# Patient Record
Sex: FEMALE | Race: OTHER | NOT HISPANIC OR LATINO | Employment: OTHER | URBAN - METROPOLITAN AREA
[De-identification: names, ages, dates, MRNs, and addresses within clinical notes are randomized per-mention and may not be internally consistent; named-entity substitution may affect disease eponyms.]

---

## 2021-01-02 ENCOUNTER — HOSPITAL ENCOUNTER (OUTPATIENT)
Facility: MEDICAL CENTER | Age: 81
End: 2021-01-02
Payer: COMMERCIAL

## 2021-01-02 LAB
COVID ORDER STATUS COVID19: NORMAL
SARS-COV-2 RNA RESP QL NAA+PROBE: NOTDETECTED
SPECIMEN SOURCE: NORMAL

## 2021-01-14 DIAGNOSIS — Z23 NEED FOR VACCINATION: ICD-10-CM

## 2021-03-12 ENCOUNTER — IMMUNIZATION (OUTPATIENT)
Dept: FAMILY PLANNING/WOMEN'S HEALTH CLINIC | Facility: IMMUNIZATION CENTER | Age: 81
End: 2021-03-12
Attending: INTERNAL MEDICINE
Payer: MEDICARE

## 2021-03-12 DIAGNOSIS — Z23 ENCOUNTER FOR VACCINATION: Primary | ICD-10-CM

## 2021-03-12 DIAGNOSIS — Z23 NEED FOR VACCINATION: ICD-10-CM

## 2021-03-12 PROCEDURE — 91301 MODERNA SARS-COV-2 VACCINE: CPT

## 2021-03-12 PROCEDURE — 0011A MODERNA SARS-COV-2 VACCINE: CPT

## 2021-04-10 ENCOUNTER — IMMUNIZATION (OUTPATIENT)
Dept: FAMILY PLANNING/WOMEN'S HEALTH CLINIC | Facility: IMMUNIZATION CENTER | Age: 81
End: 2021-04-10
Attending: INTERNAL MEDICINE
Payer: MEDICARE

## 2021-04-10 DIAGNOSIS — Z23 ENCOUNTER FOR VACCINATION: Primary | ICD-10-CM

## 2021-04-10 PROCEDURE — 0012A MODERNA SARS-COV-2 VACCINE: CPT | Performed by: NURSE PRACTITIONER

## 2021-04-10 PROCEDURE — 91301 MODERNA SARS-COV-2 VACCINE: CPT | Performed by: NURSE PRACTITIONER

## 2022-09-27 ENCOUNTER — HOSPITAL ENCOUNTER (OUTPATIENT)
Dept: LAB | Facility: MEDICAL CENTER | Age: 82
End: 2022-09-27
Attending: FAMILY MEDICINE
Payer: MEDICARE

## 2022-09-27 LAB
ALBUMIN SERPL BCP-MCNC: 4.5 G/DL (ref 3.2–4.9)
ALBUMIN/GLOB SERPL: 1.3 G/DL
ALP SERPL-CCNC: 71 U/L (ref 30–99)
ALT SERPL-CCNC: 21 U/L (ref 2–50)
ANION GAP SERPL CALC-SCNC: 13 MMOL/L (ref 7–16)
AST SERPL-CCNC: 28 U/L (ref 12–45)
BASOPHILS # BLD AUTO: 1.3 % (ref 0–1.8)
BASOPHILS # BLD: 0.12 K/UL (ref 0–0.12)
BILIRUB SERPL-MCNC: 0.6 MG/DL (ref 0.1–1.5)
BUN SERPL-MCNC: 13 MG/DL (ref 8–22)
CALCIUM SERPL-MCNC: 9.2 MG/DL (ref 8.5–10.5)
CHLORIDE SERPL-SCNC: 106 MMOL/L (ref 96–112)
CO2 SERPL-SCNC: 25 MMOL/L (ref 20–33)
CREAT SERPL-MCNC: 0.79 MG/DL (ref 0.5–1.4)
EOSINOPHIL # BLD AUTO: 0.41 K/UL (ref 0–0.51)
EOSINOPHIL NFR BLD: 4.6 % (ref 0–6.9)
ERYTHROCYTE [DISTWIDTH] IN BLOOD BY AUTOMATED COUNT: 35.8 FL (ref 35.9–50)
FERRITIN SERPL-MCNC: 1973 NG/ML (ref 10–291)
GFR SERPLBLD CREATININE-BSD FMLA CKD-EPI: 75 ML/MIN/1.73 M 2
GLOBULIN SER CALC-MCNC: 3.4 G/DL (ref 1.9–3.5)
GLUCOSE SERPL-MCNC: 90 MG/DL (ref 65–99)
HCT VFR BLD AUTO: 37.5 % (ref 37–47)
HGB BLD-MCNC: 11.4 G/DL (ref 12–16)
IMM GRANULOCYTES # BLD AUTO: 0.01 K/UL (ref 0–0.11)
IMM GRANULOCYTES NFR BLD AUTO: 0.1 % (ref 0–0.9)
IRON SATN MFR SERPL: 52 % (ref 15–55)
IRON SERPL-MCNC: 106 UG/DL (ref 40–170)
LYMPHOCYTES # BLD AUTO: 3.61 K/UL (ref 1–4.8)
LYMPHOCYTES NFR BLD: 40.3 % (ref 22–41)
MCH RBC QN AUTO: 21.4 PG (ref 27–33)
MCHC RBC AUTO-ENTMCNC: 30.4 G/DL (ref 33.6–35)
MCV RBC AUTO: 70.4 FL (ref 81.4–97.8)
MONOCYTES # BLD AUTO: 0.72 K/UL (ref 0–0.85)
MONOCYTES NFR BLD AUTO: 8 % (ref 0–13.4)
NEUTROPHILS # BLD AUTO: 4.08 K/UL (ref 2–7.15)
NEUTROPHILS NFR BLD: 45.7 % (ref 44–72)
NRBC # BLD AUTO: 0 K/UL
NRBC BLD-RTO: 0 /100 WBC
PLATELET # BLD AUTO: 339 K/UL (ref 164–446)
PMV BLD AUTO: 10.9 FL (ref 9–12.9)
POTASSIUM SERPL-SCNC: 4.2 MMOL/L (ref 3.6–5.5)
PROT SERPL-MCNC: 7.9 G/DL (ref 6–8.2)
RBC # BLD AUTO: 5.33 M/UL (ref 4.2–5.4)
SODIUM SERPL-SCNC: 144 MMOL/L (ref 135–145)
TIBC SERPL-MCNC: 202 UG/DL (ref 250–450)
TSH SERPL DL<=0.005 MIU/L-ACNC: 1.25 UIU/ML (ref 0.38–5.33)
UIBC SERPL-MCNC: 96 UG/DL (ref 110–370)
VIT B12 SERPL-MCNC: 993 PG/ML (ref 211–911)
WBC # BLD AUTO: 9 K/UL (ref 4.8–10.8)

## 2022-09-27 PROCEDURE — 80053 COMPREHEN METABOLIC PANEL: CPT

## 2022-09-27 PROCEDURE — 82728 ASSAY OF FERRITIN: CPT

## 2022-09-27 PROCEDURE — 82607 VITAMIN B-12: CPT

## 2022-09-27 PROCEDURE — 36415 COLL VENOUS BLD VENIPUNCTURE: CPT

## 2022-09-27 PROCEDURE — 84443 ASSAY THYROID STIM HORMONE: CPT | Mod: GA

## 2022-09-27 PROCEDURE — 85025 COMPLETE CBC W/AUTO DIFF WBC: CPT

## 2022-09-27 PROCEDURE — 83540 ASSAY OF IRON: CPT

## 2022-09-27 PROCEDURE — 83550 IRON BINDING TEST: CPT

## 2024-06-10 ENCOUNTER — HOSPITAL ENCOUNTER (OUTPATIENT)
Dept: LAB | Facility: MEDICAL CENTER | Age: 84
End: 2024-06-10
Attending: FAMILY MEDICINE
Payer: MEDICARE

## 2024-06-10 LAB
ALBUMIN SERPL BCP-MCNC: 4.1 G/DL (ref 3.2–4.9)
ALBUMIN/GLOB SERPL: 1.2 G/DL
ALP SERPL-CCNC: 86 U/L (ref 30–99)
ALT SERPL-CCNC: 23 U/L (ref 2–50)
ANION GAP SERPL CALC-SCNC: 12 MMOL/L (ref 7–16)
AST SERPL-CCNC: 32 U/L (ref 12–45)
BASOPHILS # BLD AUTO: 1 % (ref 0–1.8)
BASOPHILS # BLD: 0.08 K/UL (ref 0–0.12)
BILIRUB SERPL-MCNC: 0.4 MG/DL (ref 0.1–1.5)
BUN SERPL-MCNC: 17 MG/DL (ref 8–22)
CALCIUM ALBUM COR SERPL-MCNC: 8.7 MG/DL (ref 8.5–10.5)
CALCIUM SERPL-MCNC: 8.8 MG/DL (ref 8.5–10.5)
CHLORIDE SERPL-SCNC: 105 MMOL/L (ref 96–112)
CO2 SERPL-SCNC: 23 MMOL/L (ref 20–33)
CREAT SERPL-MCNC: 0.8 MG/DL (ref 0.5–1.4)
EOSINOPHIL # BLD AUTO: 0.35 K/UL (ref 0–0.51)
EOSINOPHIL NFR BLD: 4.2 % (ref 0–6.9)
ERYTHROCYTE [DISTWIDTH] IN BLOOD BY AUTOMATED COUNT: 33.8 FL (ref 35.9–50)
FERRITIN SERPL-MCNC: 1792 NG/ML (ref 10–291)
GFR SERPLBLD CREATININE-BSD FMLA CKD-EPI: 73 ML/MIN/1.73 M 2
GLOBULIN SER CALC-MCNC: 3.3 G/DL (ref 1.9–3.5)
GLUCOSE SERPL-MCNC: 94 MG/DL (ref 65–99)
HCT VFR BLD AUTO: 35.6 % (ref 37–47)
HGB BLD-MCNC: 10.9 G/DL (ref 12–16)
IMM GRANULOCYTES # BLD AUTO: 0.02 K/UL (ref 0–0.11)
IMM GRANULOCYTES NFR BLD AUTO: 0.2 % (ref 0–0.9)
IRON SATN MFR SERPL: 56 % (ref 15–55)
IRON SERPL-MCNC: 108 UG/DL (ref 40–170)
LYMPHOCYTES # BLD AUTO: 2.9 K/UL (ref 1–4.8)
LYMPHOCYTES NFR BLD: 34.5 % (ref 22–41)
MCH RBC QN AUTO: 21 PG (ref 27–33)
MCHC RBC AUTO-ENTMCNC: 30.6 G/DL (ref 32.2–35.5)
MCV RBC AUTO: 68.6 FL (ref 81.4–97.8)
MONOCYTES # BLD AUTO: 0.64 K/UL (ref 0–0.85)
MONOCYTES NFR BLD AUTO: 7.6 % (ref 0–13.4)
NEUTROPHILS # BLD AUTO: 4.41 K/UL (ref 1.82–7.42)
NEUTROPHILS NFR BLD: 52.5 % (ref 44–72)
NRBC # BLD AUTO: 0 K/UL
NRBC BLD-RTO: 0 /100 WBC (ref 0–0.2)
PLATELET # BLD AUTO: 284 K/UL (ref 164–446)
PMV BLD AUTO: 11 FL (ref 9–12.9)
POTASSIUM SERPL-SCNC: 4.1 MMOL/L (ref 3.6–5.5)
PROT SERPL-MCNC: 7.4 G/DL (ref 6–8.2)
RBC # BLD AUTO: 5.19 M/UL (ref 4.2–5.4)
SODIUM SERPL-SCNC: 140 MMOL/L (ref 135–145)
TIBC SERPL-MCNC: 192 UG/DL (ref 250–450)
UIBC SERPL-MCNC: 84 UG/DL (ref 110–370)
VIT B12 SERPL-MCNC: 1139 PG/ML (ref 211–911)
WBC # BLD AUTO: 8.4 K/UL (ref 4.8–10.8)

## 2024-06-10 PROCEDURE — 80053 COMPREHEN METABOLIC PANEL: CPT

## 2024-06-10 PROCEDURE — 83550 IRON BINDING TEST: CPT

## 2024-06-10 PROCEDURE — 36415 COLL VENOUS BLD VENIPUNCTURE: CPT

## 2024-06-10 PROCEDURE — 83540 ASSAY OF IRON: CPT

## 2024-06-10 PROCEDURE — 85025 COMPLETE CBC W/AUTO DIFF WBC: CPT

## 2024-06-10 PROCEDURE — 82607 VITAMIN B-12: CPT

## 2024-06-10 PROCEDURE — 82728 ASSAY OF FERRITIN: CPT

## 2024-08-20 ENCOUNTER — APPOINTMENT (OUTPATIENT)
Dept: RADIOLOGY | Facility: MEDICAL CENTER | Age: 84
End: 2024-08-20
Attending: EMERGENCY MEDICINE
Payer: MEDICARE

## 2024-08-20 ENCOUNTER — HOSPITAL ENCOUNTER (OUTPATIENT)
Facility: MEDICAL CENTER | Age: 84
End: 2024-08-21
Attending: EMERGENCY MEDICINE | Admitting: HOSPITALIST
Payer: MEDICARE

## 2024-08-20 DIAGNOSIS — I63.9 ISCHEMIC STROKE (HCC): ICD-10-CM

## 2024-08-20 DIAGNOSIS — R53.1 WEAKNESS: ICD-10-CM

## 2024-08-20 DIAGNOSIS — I63.9 CEREBROVASCULAR ACCIDENT (CVA), UNSPECIFIED MECHANISM (HCC): ICD-10-CM

## 2024-08-20 DIAGNOSIS — R41.82 ALTERED MENTAL STATUS, UNSPECIFIED ALTERED MENTAL STATUS TYPE: ICD-10-CM

## 2024-08-20 PROBLEM — I48.20 CHRONIC ATRIAL FIBRILLATION (HCC): Status: ACTIVE | Noted: 2024-08-20

## 2024-08-20 LAB
ALBUMIN SERPL BCP-MCNC: 4 G/DL (ref 3.2–4.9)
ALBUMIN/GLOB SERPL: 1.2 G/DL
ALP SERPL-CCNC: 72 U/L (ref 30–99)
ALT SERPL-CCNC: 32 U/L (ref 2–50)
ANION GAP SERPL CALC-SCNC: 13 MMOL/L (ref 7–16)
APTT PPP: 26.3 SEC (ref 24.7–36)
AST SERPL-CCNC: 29 U/L (ref 12–45)
BASOPHILS # BLD AUTO: 0.7 % (ref 0–1.8)
BASOPHILS # BLD: 0.09 K/UL (ref 0–0.12)
BILIRUB SERPL-MCNC: 0.7 MG/DL (ref 0.1–1.5)
BUN SERPL-MCNC: 16 MG/DL (ref 8–22)
CALCIUM ALBUM COR SERPL-MCNC: 8.3 MG/DL (ref 8.5–10.5)
CALCIUM SERPL-MCNC: 8.3 MG/DL (ref 8.5–10.5)
CHLORIDE SERPL-SCNC: 106 MMOL/L (ref 96–112)
CO2 SERPL-SCNC: 18 MMOL/L (ref 20–33)
CREAT SERPL-MCNC: 0.63 MG/DL (ref 0.5–1.4)
EKG IMPRESSION: NORMAL
EOSINOPHIL # BLD AUTO: 0.06 K/UL (ref 0–0.51)
EOSINOPHIL NFR BLD: 0.5 % (ref 0–6.9)
ERYTHROCYTE [DISTWIDTH] IN BLOOD BY AUTOMATED COUNT: 34.5 FL (ref 35.9–50)
GFR SERPLBLD CREATININE-BSD FMLA CKD-EPI: 88 ML/MIN/1.73 M 2
GLOBULIN SER CALC-MCNC: 3.4 G/DL (ref 1.9–3.5)
GLUCOSE SERPL-MCNC: 119 MG/DL (ref 65–99)
HCT VFR BLD AUTO: 36.8 % (ref 37–47)
HGB BLD-MCNC: 11.5 G/DL (ref 12–16)
IMM GRANULOCYTES # BLD AUTO: 0.05 K/UL (ref 0–0.11)
IMM GRANULOCYTES NFR BLD AUTO: 0.4 % (ref 0–0.9)
INR PPP: 0.99 (ref 0.87–1.13)
LYMPHOCYTES # BLD AUTO: 2.81 K/UL (ref 1–4.8)
LYMPHOCYTES NFR BLD: 22.2 % (ref 22–41)
MCH RBC QN AUTO: 21.3 PG (ref 27–33)
MCHC RBC AUTO-ENTMCNC: 31.3 G/DL (ref 32.2–35.5)
MCV RBC AUTO: 68 FL (ref 81.4–97.8)
MONOCYTES # BLD AUTO: 0.7 K/UL (ref 0–0.85)
MONOCYTES NFR BLD AUTO: 5.5 % (ref 0–13.4)
NEUTROPHILS # BLD AUTO: 8.93 K/UL (ref 1.82–7.42)
NEUTROPHILS NFR BLD: 70.7 % (ref 44–72)
NRBC # BLD AUTO: 0 K/UL
NRBC BLD-RTO: 0 /100 WBC (ref 0–0.2)
PLATELET # BLD AUTO: 293 K/UL (ref 164–446)
PMV BLD AUTO: 10.3 FL (ref 9–12.9)
POTASSIUM SERPL-SCNC: 4.7 MMOL/L (ref 3.6–5.5)
PROT SERPL-MCNC: 7.4 G/DL (ref 6–8.2)
PROTHROMBIN TIME: 13.2 SEC (ref 12–14.6)
RBC # BLD AUTO: 5.41 M/UL (ref 4.2–5.4)
SODIUM SERPL-SCNC: 137 MMOL/L (ref 135–145)
TROPONIN T SERPL-MCNC: 154 NG/L (ref 6–19)
WBC # BLD AUTO: 12.6 K/UL (ref 4.8–10.8)

## 2024-08-20 PROCEDURE — 70498 CT ANGIOGRAPHY NECK: CPT

## 2024-08-20 PROCEDURE — 70496 CT ANGIOGRAPHY HEAD: CPT

## 2024-08-20 PROCEDURE — 0042T CT-CEREBRAL PERFUSION ANALYSIS: CPT

## 2024-08-20 PROCEDURE — 80053 COMPREHEN METABOLIC PANEL: CPT

## 2024-08-20 PROCEDURE — 84484 ASSAY OF TROPONIN QUANT: CPT

## 2024-08-20 PROCEDURE — G0378 HOSPITAL OBSERVATION PER HR: HCPCS

## 2024-08-20 PROCEDURE — 96374 THER/PROPH/DIAG INJ IV PUSH: CPT

## 2024-08-20 PROCEDURE — 700117 HCHG RX CONTRAST REV CODE 255: Mod: UD | Performed by: EMERGENCY MEDICINE

## 2024-08-20 PROCEDURE — 85025 COMPLETE CBC W/AUTO DIFF WBC: CPT

## 2024-08-20 PROCEDURE — 71045 X-RAY EXAM CHEST 1 VIEW: CPT

## 2024-08-20 PROCEDURE — 85610 PROTHROMBIN TIME: CPT

## 2024-08-20 PROCEDURE — 93005 ELECTROCARDIOGRAM TRACING: CPT | Performed by: EMERGENCY MEDICINE

## 2024-08-20 PROCEDURE — 70450 CT HEAD/BRAIN W/O DYE: CPT

## 2024-08-20 PROCEDURE — 99285 EMERGENCY DEPT VISIT HI MDM: CPT

## 2024-08-20 PROCEDURE — 99205 OFFICE O/P NEW HI 60 MIN: CPT | Performed by: NURSE PRACTITIONER

## 2024-08-20 PROCEDURE — 94760 N-INVAS EAR/PLS OXIMETRY 1: CPT

## 2024-08-20 PROCEDURE — 99223 1ST HOSP IP/OBS HIGH 75: CPT | Performed by: HOSPITALIST

## 2024-08-20 PROCEDURE — 85730 THROMBOPLASTIN TIME PARTIAL: CPT

## 2024-08-20 PROCEDURE — 96375 TX/PRO/DX INJ NEW DRUG ADDON: CPT

## 2024-08-20 PROCEDURE — 36415 COLL VENOUS BLD VENIPUNCTURE: CPT

## 2024-08-20 PROCEDURE — 700111 HCHG RX REV CODE 636 W/ 250 OVERRIDE (IP): Mod: JZ,UD | Performed by: HOSPITALIST

## 2024-08-20 RX ORDER — LORAZEPAM 2 MG/ML
1 INJECTION INTRAMUSCULAR
Status: DISCONTINUED | OUTPATIENT
Start: 2024-08-20 | End: 2024-08-21 | Stop reason: HOSPADM

## 2024-08-20 RX ORDER — ALENDRONATE SODIUM 70 MG/1
70 TABLET ORAL
Status: ON HOLD | COMMUNITY
End: 2024-08-21

## 2024-08-20 RX ORDER — MORPHINE SULFATE 4 MG/ML
4 INJECTION INTRAVENOUS
Status: DISCONTINUED | OUTPATIENT
Start: 2024-08-20 | End: 2024-08-21 | Stop reason: HOSPADM

## 2024-08-20 RX ORDER — MORPHINE SULFATE 10 MG/5ML
20 SOLUTION ORAL
Status: DISCONTINUED | OUTPATIENT
Start: 2024-08-20 | End: 2024-08-21 | Stop reason: HOSPADM

## 2024-08-20 RX ORDER — ATROPINE SULFATE 10 MG/ML
2 SOLUTION/ DROPS OPHTHALMIC EVERY 4 HOURS PRN
Status: DISCONTINUED | OUTPATIENT
Start: 2024-08-20 | End: 2024-08-21 | Stop reason: HOSPADM

## 2024-08-20 RX ADMIN — IOHEXOL 80 ML: 350 INJECTION, SOLUTION INTRAVENOUS at 10:28

## 2024-08-20 RX ADMIN — LORAZEPAM 1 MG: 2 INJECTION INTRAMUSCULAR; INTRAVENOUS at 14:23

## 2024-08-20 RX ADMIN — MORPHINE SULFATE 4 MG: 4 INJECTION, SOLUTION INTRAMUSCULAR; INTRAVENOUS at 14:23

## 2024-08-20 RX ADMIN — IOHEXOL 40 ML: 350 INJECTION, SOLUTION INTRAVENOUS at 10:22

## 2024-08-20 ASSESSMENT — FIBROSIS 4 INDEX
FIB4 SCORE: 1.95
FIB4 SCORE: 1.45

## 2024-08-20 NOTE — H&P
Hospital Medicine History & Physical Note    Date of Service  8/20/2024    Primary Care Physician  Antelmo Arellano D.O.    Consultants  neurology    Specialist Names: Dr. Mas    Code Status  Comfort Care/DNR    Chief Complaint  Chief Complaint   Patient presents with    ALOC     LKW 19:30 last night, baseline GCS 15, arrives GCS 8    Extremity Weakness     R sided deficit    Possible Stroke     R sided deficits, R facial droop, R neglect, no baseline deficits or hx of stroke, no thinners        History of Presenting Illness  Maribel Salmon is a 83 y.o. female who presented 8/20/2024 with right sided weakness and decreased level of consciousness.  Ms. Salmon has an unknown medical history that apparently was in her usual state of health when she went to bed last night and was brought in by paramedics this morning due to decreased level consciousness and right-sided hemiplegia.  She had a CT scan revealing significant completed stroke in the left MCA territory.  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  She has not candidate for thrombectomy.  Given her severe stroke and advanced age the decision was made for comfort care should be placed under observation status for comfort care measures.  Hospice consult has been placed.  IV Ativan and morphine have been ordered.    I discussed the plan of care with family and Dr. Mclean in person.    Review of Systems  Review of Systems   Unable to perform ROS: Mental acuity       Past Medical History   has no past medical history on file.    Surgical History   has no past surgical history on file.     Family History  family history is not on file.   Family history reviewed with patient. There is no family history that is pertinent to the chief complaint.     Social History     Lives with her daughter  Allergies  No Known Allergies    Medications  Prior to Admission Medications   Prescriptions  Last Dose Informant Patient Reported? Taking?   Ascorbic Acid (TRINI-C PO) 8/19/2024 at PM Family Member Yes Yes   Sig: Take 1 Tablet by mouth every evening.   Cholecalciferol (VITAMIN D3 PO) 8/19/2024 at PM Family Member Yes Yes   Sig: Take 1 Tablet by mouth every evening.   Cyanocobalamin (VITAMIN B-12 PO) 8/19/2024 at PM Family Member Yes Yes   Sig: Take 1 Tablet by mouth every evening.   Ferrous Sulfate (IRON PO) 8/19/2024 at PM Family Member Yes Yes   Sig: Take 1 Tablet by mouth every evening.   alendronate (FOSAMAX) 70 MG Tab 8/18/2024 at UNK Family Member Yes Yes   Sig: Take 70 mg by mouth every 7 days.   multivitamin Tab 8/19/2024 at PM Family Member Yes Yes   Sig: Take 1 Tablet by mouth every evening.      Facility-Administered Medications: None       Physical Exam  Temp:  [36.7 °C (98 °F)] 36.7 °C (98 °F)  Pulse:  [] 147  Resp:  [26] 26  BP: (171-200)/(67-78) 171/67  SpO2:  [89 %-98 %] 94 %  Blood Pressure : (!) 171/67   Temperature: 36.7 °C (98 °F)   Pulse: (!) 147   Respiration: (!) 26   Pulse Oximetry: 94 %       Physical Exam  Vitals and nursing note reviewed.   Constitutional:       Appearance: She is ill-appearing and toxic-appearing.   Eyes:      Comments: Gaze to the left   Cardiovascular:      Rate and Rhythm: Tachycardia present. Rhythm irregular.   Pulmonary:      Comments: tachypnea  Neurological:      Sensory: Sensory deficit: comfort care.      Comments: She moves the left arm spontaneously  No movement of the right extremities  Non-verbal  She does not follow         Laboratory:  Recent Labs     08/20/24  0930   WBC 12.6*   RBC 5.41*   HEMOGLOBIN 11.5*   HEMATOCRIT 36.8*   MCV 68.0*   MCH 21.3*   MCHC 31.3*   RDW 34.5*   PLATELETCT 293   MPV 10.3     Recent Labs     08/20/24  0930   SODIUM 137   POTASSIUM 4.7   CHLORIDE 106   CO2 18*   GLUCOSE 119*   BUN 16   CREATININE 0.63   CALCIUM 8.3*     Recent Labs     08/20/24  0930   ALTSGPT 32   ASTSGOT 29   ALKPHOSPHAT 72   TBILIRUBIN 0.7  "  GLUCOSE 119*     Recent Labs     08/20/24  0930   APTT 26.3   INR 0.99     No results for input(s): \"NTPROBNP\" in the last 72 hours.      Recent Labs     08/20/24  0930   TROPONINT 154*       Imaging:  DX-CHEST-PORTABLE (1 VIEW)   Final Result      1.  Moderate diffuse interstitial opacities consistent with interstitial fibrosis and/or interstitial pulmonary edema.      CT-CTA NECK WITH & W/O-POST PROCESSING   Final Result      1.  Atherosclerotic plaque and calcification right cervical carotid bifurcation and right internal carotid without occlusion.   2.  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 Findings concordant with CTA of the head from today's date.      CT-CTA HEAD WITH & W/O-POST PROCESS   Final Result         1. Atherosclerosis.   2. There is occlusion of the left internal carotid artery and middle cerebral artery. There is also occlusion of the left posterior cerebral artery.      Findings were communicated to the ordering provider at the time of dictation via Voalte.      CT-HEAD W/O   Final Result         1. Edema and left cerebral low attenuation is likely secondary to infarction.   2. No intracranial hemorrhage is evident.               CT-CEREBRAL PERFUSION ANALYSIS    (Results Pending)           Assessment/Plan:  Justification for Admission Status  I anticipate this patient is appropriate for observation status at this time because comfort care    Patient will need a Med/Surg bed on MEDICAL service .  The need is secondary to as above.    * Ischemic stroke (HCC)- (present on admission)  Assessment & Plan  This is a devastating ischemic stroke with high risk of hemorrhagic transformation and severe debility an 83-year-old patient.  She has been in evaluated by neurology and family is elected for DNR/DNI and comfort care.  She will be placed on room air and given IV Ativan for anxiety/agitation and IV morphine for air hunger or pain.  Her condition worsens " then she may be an excellent candidate for IV morphine drip.  Scopolamine for excess secretions.  Hospice consult.    Chronic atrial fibrillation (HCC)- (present on admission)  Assessment & Plan  Unknown if this is new or old        VTE prophylaxis: not indicated

## 2024-08-20 NOTE — ED PROVIDER NOTES
"  ER Provider Note    Scribed for Ramon Mclean M.D. by Paulette Torres. 8/20/2024   9:32 AM    Primary Care Provider: Antelmo Arellano D.O.    CHIEF COMPLAINT  Chief Complaint   Patient presents with    ALOC     LKW 19:30 last night, baseline GCS 15, arrives GCS 8    Extremity Weakness     R sided deficit    Possible Stroke     R sided deficits, R facial droop, R neglect, no baseline deficits or hx of stroke, no thinners      EXTERNAL RECORDS REVIEWED  Outpatient Notes Patient was last seen by PCP in 2019     HPI/ROS  LIMITATION TO HISTORY   Select: Altered mental status / Confusion  OUTSIDE HISTORIAN(S):  EMS who brought patient in and contributed to medical history    Maribel Salmon is a 83 y.o. female with a history of osteoporosis.who presents to the ED via EMS for a stroke evaluation, last known well: yesterday at 19:30. Per EMS, family thought she had been sleeping and when they went back found that she was not waking up. EMS report that patient is GCS 15 at baseline but is now GCS 8 and experiencing right sided weakness, right sided facial droop and right sided neglect. They do not report a history of stroke. EMS report a blood sugar of 150  and 84% oxygen on room air. Patient is not on blood thinners.     PAST MEDICAL HISTORY  No pertinent past medical history     SURGICAL HISTORY  No pertinent past surgical history     FAMILY HISTORY  No pertinent family history     SOCIAL HISTORY  No pertinent social history     CURRENT MEDICATIONS  No current outpatient medications    ALLERGIES  No known drug allergies     PHYSICAL EXAM  BP (!) 171/67   Pulse (!) 147   Temp 36.7 °C (98 °F) (Temporal)   Resp (!) 26   Ht 1.321 m (4' 4\")   Wt 44.9 kg (99 lb)   SpO2 94%   BMI 25.74 kg/m²    Nursing note and vitals reviewed.  Constitutional: GCS 8   HENT: Head is normocephalic and atraumatic. Oropharynx is clear and moist without exudate or erythema.   Eyes: Pupils are round. Conjunctiva are normal.  Left gaze " deviation noted.  Cardiovascular: Tachycardic, irregular rhythm, no murmur heard. Normal radial pulses.  Pulmonary/Chest: Breath sounds normal. No wheezes or rales.   Abdominal: Soft and non-tender. No distention    Musculoskeletal: Extremities exhibit normal range of motion without edema or tenderness.   Neurological: Left gaze deviation. Right hemiparesis. Right side neglect.  High NIH stroke scale score.  Skin: Skin is warm and dry. No rash.   Psychiatric: Unable to assess  DIAGNOSTIC STUDIES  Labs:   Results for orders placed or performed during the hospital encounter of 08/20/24   CBC WITH DIFFERENTIAL   Result Value Ref Range    WBC 12.6 (H) 4.8 - 10.8 K/uL    RBC 5.41 (H) 4.20 - 5.40 M/uL    Hemoglobin 11.5 (L) 12.0 - 16.0 g/dL    Hematocrit 36.8 (L) 37.0 - 47.0 %    MCV 68.0 (L) 81.4 - 97.8 fL    MCH 21.3 (L) 27.0 - 33.0 pg    MCHC 31.3 (L) 32.2 - 35.5 g/dL    RDW 34.5 (L) 35.9 - 50.0 fL    Platelet Count 293 164 - 446 K/uL    MPV 10.3 9.0 - 12.9 fL    Neutrophils-Polys 70.70 44.00 - 72.00 %    Lymphocytes 22.20 22.00 - 41.00 %    Monocytes 5.50 0.00 - 13.40 %    Eosinophils 0.50 0.00 - 6.90 %    Basophils 0.70 0.00 - 1.80 %    Immature Granulocytes 0.40 0.00 - 0.90 %    Nucleated RBC 0.00 0.00 - 0.20 /100 WBC    Neutrophils (Absolute) 8.93 (H) 1.82 - 7.42 K/uL    Lymphs (Absolute) 2.81 1.00 - 4.80 K/uL    Monos (Absolute) 0.70 0.00 - 0.85 K/uL    Eos (Absolute) 0.06 0.00 - 0.51 K/uL    Baso (Absolute) 0.09 0.00 - 0.12 K/uL    Immature Granulocytes (abs) 0.05 0.00 - 0.11 K/uL    NRBC (Absolute) 0.00 K/uL   COMP METABOLIC PANEL   Result Value Ref Range    Sodium 137 135 - 145 mmol/L    Potassium 4.7 3.6 - 5.5 mmol/L    Chloride 106 96 - 112 mmol/L    Co2 18 (L) 20 - 33 mmol/L    Anion Gap 13.0 7.0 - 16.0    Glucose 119 (H) 65 - 99 mg/dL    Bun 16 8 - 22 mg/dL    Creatinine 0.63 0.50 - 1.40 mg/dL    Calcium 8.3 (L) 8.5 - 10.5 mg/dL    Correct Calcium 8.3 (L) 8.5 - 10.5 mg/dL    AST(SGOT) 29 12 - 45 U/L     ALT(SGPT) 32 2 - 50 U/L    Alkaline Phosphatase 72 30 - 99 U/L    Total Bilirubin 0.7 0.1 - 1.5 mg/dL    Albumin 4.0 3.2 - 4.9 g/dL    Total Protein 7.4 6.0 - 8.2 g/dL    Globulin 3.4 1.9 - 3.5 g/dL    A-G Ratio 1.2 g/dL   PROTHROMBIN TIME   Result Value Ref Range    PT 13.2 12.0 - 14.6 sec    INR 0.99 0.87 - 1.13   APTT   Result Value Ref Range    APTT 26.3 24.7 - 36.0 sec   TROPONIN   Result Value Ref Range    Troponin T 154 (H) 6 - 19 ng/L   ESTIMATED GFR   Result Value Ref Range    GFR (CKD-EPI) 88 >60 mL/min/1.73 m 2   EKG (NOW)   Result Value Ref Range    Report       [facility] Emergency Dept.    Test Date:  2024  Pt Name:    LEILANI CHACON             Department: ER  MRN:        [id number]                      Room:        02  Gender:     Female                       Technician: [id number]  :        [id number]                   Requested By:JOON SUN  Order #:    [id number]                    Reading MD: JOON SUN MD    Measurements  Intervals                                Axis  Rate:       145                          P:          93  CT:         57                           QRS:        49  QRSD:       87                           T:          83  QT:         331  QTc:        514    Interpretive Statements  ATRIAL FIBRILLATION WITH RAPID VENTRICULAR RESPONSE  Ventricular premature complex  ST depression, probably rate related  No previous ECG available for comparison  Electronically Signed On 2024 10:47:07 PDT by JOON SUN MD       EKG:   I have independently interpreted this EKG as detailed above.     Radiology:   This attending emergency physician has independently interpreted the diagnostic imaging associated with this visit and is awaiting the final reading from the radiologist.   Preliminary interpretation is a follows: CT head shows findings consistent with evolving MCA stroke.  No intracranial hemorrhage.  Radiologist interpretation:    DX-CHEST-PORTABLE (1 VIEW)   Final Result      1.  Moderate diffuse interstitial opacities consistent with interstitial fibrosis and/or interstitial pulmonary edema.      CT-CTA NECK WITH & W/O-POST PROCESSING   Final Result      1.  Atherosclerotic plaque and calcification right cervical carotid bifurcation and right internal carotid without occlusion.   2.  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 Findings concordant with CTA of the head from today's date.      CT-CTA HEAD WITH & W/O-POST PROCESS   Final Result         1. Atherosclerosis.   2. There is occlusion of the left internal carotid artery and middle cerebral artery. There is also occlusion of the left posterior cerebral artery.      Findings were communicated to the ordering provider at the time of dictation via Voalte.      CT-HEAD W/O   Final Result         1. Edema and left cerebral low attenuation is likely secondary to infarction.   2. No intracranial hemorrhage is evident.               CT-CEREBRAL PERFUSION ANALYSIS    (Results Pending)        INITIAL ASSESSMENT AND PLAN    9:32 AM - Patient was evaluated at bedside. Ordered for DX-chest-portable, CT-Head W/o, CT-cerebral perfusion analysis, CT-CTA head with & w/o post process, CT-CTA Neck w/ & w/o, CBC w/ diff, CMP, PT, APTT, COD, Troponin, and EKG to evaluate.  Patient verbalizes understanding and support with my plan of care.  Differential diagnoses include but not limited to: CVA, altered mental status     STROKE:   Time seen 09:32 AM    National Institutes of Health (NIH) Stroke Scale   NIH Stroke Scale    Level of Consciousness: Arouses to Minor Stimulation  Ask Month and Age: No Questions Right  Blink Eyes and Squeeze Hands: Performs 1 Task  Best Gaze: Forced Deviation  Visual: No Visual Loss  Facial Palsy: Partial Paralysis  Motor, Left Arm: No Drift  Motor, Right Arm: No Movement  Motor, Left Leg: No Drift  Motor, Right Leg: No Movement  Limb  Ataxia: Absent  Sensory Loss: Normal  Best Language: Mute, Global Aphasia  Dysarthria: Normal  Extinction and Inattention: Profound Bob-Inattention or Extinction to More than One Modality    NIHSS Score: 21    No, this patient is not a candidate for thrombolytic therapy.      COURSE AND MEDICAL DECISION MAKING    10:07 AM I discussed the patient's case and the above findings with SERVANDO Quijano (Neurology) who states that this is likely a poor prognosis. Patient is a non-thrombolytic candidate. They are currently working on IV access to complete the imagining. However, this will likely be a case that is appropriate for intervention.     10:33 AM - Neuro APRN spoke to patient's family.     10:45 AM - Spoke to patient's family, who is declining escalation of care due to patient's poor prognosis and would like comfort care.     10:55 AM - Paged Hospitalist.     10:58 AM - Hospitalist responded. I discussed the patient's case and the above findings with Dr. Persaud (Hospitalist) who agrees to evaluate the patient for hospitalization.     CRITICAL CARE  The very real possibilty of a deterioration of this patient's condition required the highest level of my preparedness for sudden, emergent intervention.  I provided critical care services, which included medication orders, frequent reevaluations of the patient's condition and response to treatment, ordering and reviewing test results, and discussing the case with various consultants.  The critical care time associated with the care of the patient was 35 minutes. Review chart for interventions. This time is exclusive of any other billable procedures.     DISPOSITION AND DISCUSSIONS    I have discussed management of the patient with the following physicians and RONNY's:  EJ Quijano (Neurology), Dr. Persaud (Hospitalist)       Decision tools and prescription drugs considered including, but not limited to:  NIH: 21 .    DISPOSITION:  Patient will be hospitalized by Dr. Persaud in  guarded condition.    FINAL DIAGNOSIS  1. Cerebrovascular accident (CVA), unspecified mechanism (HCC)    2. Altered mental status, unspecified altered mental status type    3. Weakness    4. Ischemic stroke (HCC)    5. Critical care time: 35 minutes       Paulette DUNHAM (Scribe), am scribing for, and in the presence of, Ramon Mclean M.D..    Electronically signed by: Paulette Torres (Scribe), 8/20/2024    IRamon M.D. personally performed the services described in this documentation, as scribed by Paulette Torres in my presence, and it is both accurate and complete.      The note accurately reflects work and decisions made by me.  Ramon Mclean M.D.  8/20/2024  1:30 PM

## 2024-08-20 NOTE — PROGRESS NOTES
Barb daughter asks nurse if pt could eat, This RN agreed that would be ok to feed patient, but swallowing possibly has been affected. Daughter then request a peg tube placement for nutrition. This RN explained comfort care plan previously decided upon with admitting MD. Family requesting to see MD at bedside. RN informed Hung of request.

## 2024-08-20 NOTE — ED NOTES
Pt resting in bed, VSS on 6L NC, GCS 10, no neuro changes, family at bedside, family aware POC to admit

## 2024-08-20 NOTE — CONSULTS
Neurology STROKE CODE H&P  Vascular Neurology Service, [facility]    Referring Physician: Ramon Mclean M.D.    STROKE CODE:   Chief Complaint   Patient presents with    ALOC     LKW 19:30 last night, baseline GCS 15, arrives GCS 8    Extremity Weakness     R sided deficit    Possible Stroke     R sided deficits, R facial droop, R neglect, no baseline deficits or hx of stroke, no thinners        To obtain the most accurate data regarding the time called, and time patient seen, refer to the stroke run-sheet and chart.  For time of CT, refer to the radiology report. See A&P below for TPA Decision and door to needle time if and when applicable.    HPI: Maribel Salmon is a 83 y.o. female with a past medical history of osteoporosis who presented on 08/20/2024 with right sided weakness, left gaze deviation, and altered mental status. Last known well is ~1930 last night. Noncontrast CT head revealed a large area of hypodensity in the left MCA and PCA territories. CTA head and neck reveals a tandem occlusion with a left ICA thrombus that extends intracranially. There is also a left MCA M1 occlusion with PCA occlusion as well. CT perfusion demonstrates a large matched lesion in the left MCA territory consistent with a completed stroke. Neurology has been consulted for further evaluation of the above.     Review of systems: In addition to what is detailed in the HPI above, all other systems reviewed and are negative.    Past Medical History:    has no past medical history on file.    FHx:  family history is not on file.    SHx:       Allergies:  No Known Allergies    Medications:  No current facility-administered medications for this encounter.  No current outpatient medications on file.    Physical Examination:    Vitals:    08/20/24 0930 08/20/24 0934 08/20/24 1003 08/20/24 1035   BP: (!) 200/78  (!) 171/67    Pulse: 79  63 (!) 147   Resp:   (!) 26    Temp: 36.7 °C (98 °F)      TempSrc:  "Temporal      SpO2: 98%  89% 94%   Weight:  44.9 kg (99 lb)     Height:  1.321 m (4' 4\")           General: Patient is obtunded and ill appearing  Eye: Examination of optic disks not indicated at this time given acuity of consult  Neck: There is normal range of motion  CV: Regular rate   Extremities:  Clear, dry, intact, without peripheral edema    NEUROLOGICAL EXAM:     Mental status: obtunded, disoriented x 4  Speech and language: garbled speech  Cranial nerve exam: Pupils are equal, round and reactive to light bilaterally. Left gaze deviation, right side neglect. Right facial droop  Motor exam: Purposeful movement in LUE and LLE, flaccid RUE and RLE  Sensory exam:  No movement to noxious stimuli in the right arm and leg  Deep tendon reflexes:  2+ throughout. Toes down-going bilaterally.  Coordination:unable to assess  Gait: Deferred due to patient acuity    NIHSS: National Institutes of Health Stroke Scale    [2] 1a:Level of Consciousness    0-alert 1-drowsy   2-stupor   3-coma  [2] 1b:LOC Questions                  0-both  1-one      2-neither  [2] 1c:LOC Commands                   0-both  1-one      2-neither  [2] 2: Best Gaze                     0-nl    1-partial  2-forced  [2] 3: Visual Fields                   0-nl    1-partial  2-complete 3-bilat  [1] 4: Facial Paresis                0-nl    1-minor    2-partial  3-full  MOTOR                       0-nl  [4] 5: Right Arm           1-drift  [0] 6: Left Arm             2-some effort vs gravity  [4] 7: Right Leg           3-no effort vs gravity  [0] 8: Left Leg             4-no movement                             x-untestable  [x] 9: Limb Ataxia                    0-abs   1-1_limb   2-2+_limbs       x-untestable  [1] 10:Sensory                        0-nl    1-partial  2-dense  [3] 11:Best Language/Aphasia         0-nl    1-mild/mod 2-severe   3-mute  [2] 12:Dysarthria                     0-nl    1-mild/mod 2-severe       x-untestable  [2] " 13:Neglect/Inattention            0-none  1-partial  2-complete  [27] TOTAL    NIHSS Date/Time: 08/20/2024 @ 0945    Baseline Modified Letcher Scale (MRS): 5 = Severe disability; bedridden, incontinent, and requires constant nursing care and attention    Objective Data:    Labs:  Lab Results   Component Value Date/Time    PROTHROMBTM 13.2 08/20/2024 09:30 AM    INR 0.99 08/20/2024 09:30 AM      Lab Results   Component Value Date/Time    WBC 12.6 (H) 08/20/2024 09:30 AM    RBC 5.41 (H) 08/20/2024 09:30 AM    HEMOGLOBIN 11.5 (L) 08/20/2024 09:30 AM    HEMATOCRIT 36.8 (L) 08/20/2024 09:30 AM    MCV 68.0 (L) 08/20/2024 09:30 AM    MCH 21.3 (L) 08/20/2024 09:30 AM    MCHC 31.3 (L) 08/20/2024 09:30 AM    MPV 10.3 08/20/2024 09:30 AM    NEUTSPOLYS 70.70 08/20/2024 09:30 AM    LYMPHOCYTES 22.20 08/20/2024 09:30 AM    MONOCYTES 5.50 08/20/2024 09:30 AM    EOSINOPHILS 0.50 08/20/2024 09:30 AM    BASOPHILS 0.70 08/20/2024 09:30 AM      Lab Results   Component Value Date/Time    SODIUM 137 08/20/2024 09:30 AM    POTASSIUM 4.7 08/20/2024 09:30 AM    CHLORIDE 106 08/20/2024 09:30 AM    CO2 18 (L) 08/20/2024 09:30 AM    GLUCOSE 119 (H) 08/20/2024 09:30 AM    BUN 16 08/20/2024 09:30 AM    CREATININE 0.63 08/20/2024 09:30 AM      Lab Results   Component Value Date/Time    CHOLSTRLTOT 200 (H) 03/24/2015 09:05 AM     (H) 03/24/2015 09:05 AM    HDL 64 03/24/2015 09:05 AM    TRIGLYCERIDE 116 03/24/2015 09:05 AM       Lab Results   Component Value Date/Time    ALKPHOSPHAT 72 08/20/2024 09:30 AM    ASTSGOT 29 08/20/2024 09:30 AM    ALTSGPT 32 08/20/2024 09:30 AM    TBILIRUBIN 0.7 08/20/2024 09:30 AM        Imaging/Testing:    I interpreted and/or reviewed the patient's neuroimaging    CT-CTA HEAD WITH & W/O-POST PROCESS   Final Result         1. Atherosclerosis.   2. There is occlusion of the left internal carotid artery and middle cerebral artery. There is also occlusion of the left posterior cerebral artery.      Findings were  communicated to the ordering provider at the time of dictation via Voalte.      CT-HEAD W/O   Final Result         1. Edema and left cerebral low attenuation is likely secondary to infarction.   2. No intracranial hemorrhage is evident.               DX-CHEST-PORTABLE (1 VIEW)    (Results Pending)   CT-CEREBRAL PERFUSION ANALYSIS    (Results Pending)   CT-CTA NECK WITH & W/O-POST PROCESSING    (Results Pending)       Assessment and Plan:    83 y.o. F presenting with profound right arm and leg hemiplegia, and left gaze deviation. Last known well ~ 1930 last night. NCCTH shows a large area of hypodensity in the left MCA and PCA territories consistent with a subacute infarct. CTA head and neck a tandem occlusion with a left ICA thrombus that extends intracranially. There is also a left MCA M1 occlusion with PCA occlusion as well. CT perfusion demonstrates a large matched lesion in the left MCA territory consistent with a completed stroke. Unfortunately this is a catastrophic completed stroke, therefore the patient is not a candidate for thrombolytic therapy or mechanical thrombectomy due to no salvageable brain tissue and the abundant risk of hemorrhagic transformation from reperfusion.. At this time we recommend goals of care discussion and palliative care consultation as this stroke will result in the patient being in a permanent bed bound status with tracheostomy and Peg tube, as well as continuous care. There are no acute neurology recommendations at this time. Please reconsult Vascular Neurology if the patient's family wishes to pursue full medical management.    Case reviewed and plan created with Dr. Andrea Mas, Vascular Neurology, and Dr. Ramon Mclean, Emergency Medicine. Please call with any questions.      Bhavin PAGAN  Vascular Neurology, [facility]  [phone]

## 2024-08-20 NOTE — ED NOTES
Pt resting in bed, VSS on 2L NC, GCS 10, no neuro changes, family at bedside, family aware POC to admit

## 2024-08-20 NOTE — ASSESSMENT & PLAN NOTE
This is a devastating ischemic stroke with high risk of hemorrhagic transformation and severe debility an 83-year-old patient.  She has been in evaluated by neurology and family is elected for DNR/DNI and comfort care.  She will be placed on room air and given IV Ativan for anxiety/agitation and IV morphine for air hunger or pain.  Her condition worsens then she may be an excellent candidate for IV morphine drip.  Scopolamine for excess secretions.  Hospice consult.

## 2024-08-20 NOTE — ED TRIAGE NOTES
"Chief Complaint   Patient presents with    ALOC     LKW 19:30 last night, baseline GCS 15, arrives GCS 8    Extremity Weakness     R sided deficit    Possible Stroke     R sided deficits, R facial droop, R neglect, no baseline deficits or hx of stroke, no thinners      Pt BIB [facility] for above complaints, ERP called to bedside on arrival for stroke assessment, stroke activated and pt taken to CT scan immediately, VSS on 6L NC, GCS 10.    BP (!) 171/67   Pulse (!) 147   Temp 36.7 °C (98 °F) (Temporal)   Resp (!) 26   Ht 1.321 m (4' 4\")   Wt 44.9 kg (99 lb)   SpO2 94%   BMI 25.74 kg/m²     "

## 2024-08-20 NOTE — DISCHARGE PLANNING
Referral Management Team     Received hospice referral via [facility]    Chart review completed. Patients daughter is asking for peg tube placement and to speak to MD.     Awaiting additional GOC conversations.     RMT continues to follow

## 2024-08-20 NOTE — ED NOTES
Med Rec complete per patient's family at bedside   Allergies reviewed-yes  Antibiotics in the past 30 days:no  Anticoagulant in past 14 days:no  Pharmacy patient utilizes:[facility] on [address]

## 2024-08-20 NOTE — ED NOTES
"Per notes, \"53 y.o. female, smoker, with history of lupus on prednisone, positive family history of heart disease, who presented to the ER on 10/10/2022 with complaints of chest pain.  This started a few hours ago.  Pain is worse on the right side.  Describes this as \"sharp/stabbing \"in nature and associated with shortness of breath, rated as 6/10 in intensity.  Pain is started shortly after heated conversation with her granddaughter that is 12 years old.  She denies having similar symptoms in the past.  Denies radiation.  No alleviating or exacerbating factors.  No nausea, vomiting, no diaphoresis.  No abdominal pain.  No fever.  No coughing.\"    Patient was admitted and monitored overnight for chest pain.  EKG and troponins were negative.  She underwent a stress test on the morning of 10/11.  Prior to stress test she did report some chest pain, however stat EKG and troponins done at that time showed no abnormal findings.  She proceeded with stress test and had resolution of symptoms thereafter.  Stress test was negative for  ischemia but did show  a fixed defect in the inferior and inferolateral walls with normal wall motion that most likely represents artifact from bowel interference.  Recommended patient follow close with PCP in the outpatient setting.  " Report called to CDU RN, pt going to CDU now by transport

## 2024-08-21 VITALS
HEART RATE: 68 BPM | RESPIRATION RATE: 24 BRPM | TEMPERATURE: 97.9 F | HEIGHT: 55 IN | OXYGEN SATURATION: 95 % | DIASTOLIC BLOOD PRESSURE: 65 MMHG | BODY MASS INDEX: 15.97 KG/M2 | WEIGHT: 69 LBS | SYSTOLIC BLOOD PRESSURE: 137 MMHG

## 2024-08-21 PROBLEM — I63.9 ACUTE CVA (CEREBROVASCULAR ACCIDENT) (HCC): Status: ACTIVE | Noted: 2024-08-21

## 2024-08-21 PROCEDURE — G0378 HOSPITAL OBSERVATION PER HR: HCPCS

## 2024-08-21 PROCEDURE — 96376 TX/PRO/DX INJ SAME DRUG ADON: CPT

## 2024-08-21 PROCEDURE — 700111 HCHG RX REV CODE 636 W/ 250 OVERRIDE (IP): Mod: UD | Performed by: HOSPITALIST

## 2024-08-21 PROCEDURE — 99239 HOSP IP/OBS DSCHRG MGMT >30: CPT | Performed by: STUDENT IN AN ORGANIZED HEALTH CARE EDUCATION/TRAINING PROGRAM

## 2024-08-21 RX ADMIN — LORAZEPAM 1 MG: 2 INJECTION INTRAMUSCULAR; INTRAVENOUS at 10:59

## 2024-08-21 SDOH — ECONOMIC STABILITY: TRANSPORTATION INSECURITY
IN THE PAST 12 MONTHS, HAS LACK OF RELIABLE TRANSPORTATION KEPT YOU FROM MEDICAL APPOINTMENTS, MEETINGS, WORK OR FROM GETTING THINGS NEEDED FOR DAILY LIVING?: NO

## 2024-08-21 SDOH — ECONOMIC STABILITY: TRANSPORTATION INSECURITY
IN THE PAST 12 MONTHS, HAS THE LACK OF TRANSPORTATION KEPT YOU FROM MEDICAL APPOINTMENTS OR FROM GETTING MEDICATIONS?: NO

## 2024-08-21 ASSESSMENT — SOCIAL DETERMINANTS OF HEALTH (SDOH)
WITHIN THE LAST YEAR, HAVE YOU BEEN HUMILIATED OR EMOTIONALLY ABUSED IN OTHER WAYS BY YOUR PARTNER OR EX-PARTNER?: NO
IN THE PAST 12 MONTHS, HAS THE ELECTRIC, GAS, OIL, OR WATER COMPANY THREATENED TO SHUT OFF SERVICE IN YOUR HOME?: NO
WITHIN THE LAST YEAR, HAVE YOU BEEN KICKED, HIT, SLAPPED, OR OTHERWISE PHYSICALLY HURT BY YOUR PARTNER OR EX-PARTNER?: NO
WITHIN THE LAST YEAR, HAVE YOU BEEN AFRAID OF YOUR PARTNER OR EX-PARTNER?: NO
WITHIN THE PAST 12 MONTHS, THE FOOD YOU BOUGHT JUST DIDN'T LAST AND YOU DIDN'T HAVE MONEY TO GET MORE: NEVER TRUE
WITHIN THE PAST 12 MONTHS, YOU WORRIED THAT YOUR FOOD WOULD RUN OUT BEFORE YOU GOT THE MONEY TO BUY MORE: NEVER TRUE
WITHIN THE LAST YEAR, HAVE TO BEEN RAPED OR FORCED TO HAVE ANY KIND OF SEXUAL ACTIVITY BY YOUR PARTNER OR EX-PARTNER?: NO

## 2024-08-21 ASSESSMENT — PATIENT HEALTH QUESTIONNAIRE - PHQ9
2. FEELING DOWN, DEPRESSED, IRRITABLE, OR HOPELESS: NOT AT ALL
SUM OF ALL RESPONSES TO PHQ9 QUESTIONS 1 AND 2: 0
1. LITTLE INTEREST OR PLEASURE IN DOING THINGS: NOT AT ALL

## 2024-08-21 ASSESSMENT — LIFESTYLE VARIABLES
ON A TYPICAL DAY WHEN YOU DRINK ALCOHOL HOW MANY DRINKS DO YOU HAVE: 0
HOW MANY TIMES IN THE PAST YEAR HAVE YOU HAD 5 OR MORE DRINKS IN A DAY: 0
DOES PATIENT WANT TO STOP DRINKING: NO
TOTAL SCORE: 0
TOTAL SCORE: 0
CONSUMPTION TOTAL: NEGATIVE
HAVE YOU EVER FELT YOU SHOULD CUT DOWN ON YOUR DRINKING: NO
EVER FELT BAD OR GUILTY ABOUT YOUR DRINKING: NO
ALCOHOL_USE: NO
AVERAGE NUMBER OF DAYS PER WEEK YOU HAVE A DRINK CONTAINING ALCOHOL: 0
TOTAL SCORE: 0
HAVE PEOPLE ANNOYED YOU BY CRITICIZING YOUR DRINKING: NO
EVER HAD A DRINK FIRST THING IN THE MORNING TO STEADY YOUR NERVES TO GET RID OF A HANGOVER: NO

## 2024-08-21 ASSESSMENT — FIBROSIS 4 INDEX: FIB4 SCORE: 1.45

## 2024-08-21 NOTE — DISCHARGE SUMMARY
Hospice referral placed, case management consult prompted. RMT following, HCM team will sign off.

## 2024-08-21 NOTE — HOSPITAL COURSE
Maribel Salmon is a 83 y.o. female who presented 8/20/2024 with right sided weakness and decreased level of consciousness. Ms. Salmon has an unknown medical history that apparently was in her usual state of health when she went to bed last night and was brought in by paramedics this morning due to decreased level consciousness and right-sided hemiplegia.  She had a CT scan revealing significant completed stroke in the left MCA territory.  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  She has not candidate for thrombectomy.  Given her severe stroke and advanced age, the decision was made for comfort care should be placed under observation status for comfort care measures.  Hospice consult has been placed.  IV Ativan and morphine have been ordered.    8/21: the patient was continued on comfort care.

## 2024-08-21 NOTE — CARE PLAN
The patient is Unstable - High likelihood or risk of patient condition declining or worsening    Shift Goals  Clinical Goals: Comfort Measure  Patient Goals: KEM  Family Goals: Comfort End of life care    Progress made toward(s) clinical / shift goals:    Problem: Skin Integrity  Goal: Skin integrity is maintained or improved  Description: Target End Date:  Prior to discharge or change in level of care    Document interventions on Skin Risk/Edward flowsheet groups and corresponding LDA    1.  Assess and monitor skin integrity, appearance and/or temperature  2.  Assess risk factors for impaired skin integrity and/or pressures ulcers  3.  Implement precautions to protect skin integrity in collaboration with interdisciplinary team  4.  Implement pressure ulcer prevention protocol if at risk for skin breakdown  5.  Confirm wound care consult if at risk for skin breakdown  6.  Ensure patient use of pressure relieving devices  (Low air loss bed, waffle overlay, heel protectors, ROHO cushion, etc)  Outcome: Progressing       Patient is not progressing towards the following goals:

## 2024-08-21 NOTE — DISCHARGE PLANNING
Referral Management Team    Received hospice referral via [facility].    Choice obtained for: [facility]  Agency acceptance status: accepted     Family involved in care: Joan (dtr) [phone]  Support available at home: FLY   Placement needed:[facility]     Barriers to discharge: NA   Care team members informed: Medical team     DC transport scheduled at 1600

## 2024-08-21 NOTE — PROGRESS NOTES
Bedside shift report received from Merlin day shift RNs. Bed in lowest, locked position with call light and belongings within reach. Daughter Barb at bedside

## 2024-08-21 NOTE — DISCHARGE SUMMARY
Discharge Summary    CHIEF COMPLAINT ON ADMISSION  Chief Complaint   Patient presents with    ALOC     LKW 19:30 last night, baseline GCS 15, arrives GCS 8    Extremity Weakness     R sided deficit    Possible Stroke     R sided deficits, R facial droop, R neglect, no baseline deficits or hx of stroke, no thinners        Reason for Admission  Acute CVA    Admission Date  8/20/2024    CODE STATUS  Comfort Care/DNR    HPI & HOSPITAL COURSE  Maribel Salmon is a 83 y.o. female who presented 8/20/2024 with right sided weakness and decreased level of consciousness. Ms. Salmon has an unknown medical history that apparently was in her usual state of health when she went to bed last night and was brought in by paramedics this morning due to decreased level consciousness and right-sided hemiplegia.  She had a CT scan revealing significant completed stroke in the left MCA territory.  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  She has not candidate for thrombectomy.  Given her severe stroke and advanced age, the decision was made for comfort care should be placed under observation status for comfort care measures.  Hospice consult has been placed.  IV Ativan and morphine have been ordered.    8/21: the patient was continued on comfort care. Hospice referral process appreciated - the patient is to be DC to [facility] with hospice [facility] services.     Therefore, she is discharged in fair and stable condition to hospice.    Discharge Date  08/21/24    FOLLOW UP ITEMS POST DISCHARGE  N/A    DISCHARGE DIAGNOSES  Principal Problem:    Ischemic stroke (HCC) (POA: Yes)  Active Problems:    Chronic atrial fibrillation (HCC) (POA: Yes)    Acute CVA (cerebrovascular accident) (HCC) (POA: Yes)  Resolved Problems:    * No resolved hospital problems. *      FOLLOW UP  No future appointments.  No follow-up provider specified.    MEDICATIONS ON  DISCHARGE     Medication List        STOP taking these medications      alendronate 70 MG Tabs  Commonly known as: Fosamax     IRON PO     multivitamin Tabs     TRINI-C PO     VITAMIN B-12 PO     VITAMIN D3 PO              Allergies  No Known Allergies    DIET  Orders Placed This Encounter   Procedures    Diet Order Diet: Clear Liquid     Standing Status:   Standing     Number of Occurrences:   1     Order Specific Question:   Diet:     Answer:   Clear Liquid [10]       ACTIVITY  As tolerated.  Weight bearing as tolerated    CONSULTATIONS  Neurology    PROCEDURES  N/A    LABORATORY  Lab Results   Component Value Date    SODIUM 137 08/20/2024    POTASSIUM 4.7 08/20/2024    CHLORIDE 106 08/20/2024    CO2 18 (L) 08/20/2024    GLUCOSE 119 (H) 08/20/2024    BUN 16 08/20/2024    CREATININE 0.63 08/20/2024        Lab Results   Component Value Date    WBC 12.6 (H) 08/20/2024    HEMOGLOBIN 11.5 (L) 08/20/2024    HEMATOCRIT 36.8 (L) 08/20/2024    PLATELETCT 293 08/20/2024        Total time of the discharge process exceeds 35 minutes.

## 2024-08-21 NOTE — DISCHARGE PLANNING
DC Transport Scheduled    Transport Company Scheduled:  [employer]  Spoke with Roger at [facility] to schedule transport.    Scheduled Date: 8/21/2024  Scheduled Time: 1645    Destination: [facility]   Destination address: [address] [facility] [address]    Notified care team of scheduled transport via Voalte.     If there are any changes needed to the DC transportation scheduled, please contact [facility] at ext. [phone] between the hours of 2227-6383 Mon-Fri. If outside those hours, contact the ED Case Manager at ext. [phone].

## 2024-08-21 NOTE — PROGRESS NOTES
Pt arrived to unit via gurney at 1345. Family oriented to room, unit, and plan of care. Pt flaccid to right side, not oriented, comfort measures in place and provided as needed. Family provided with bereavement tray.All questions answered at this time. Call light within reach; fall precautions in place.

## 2024-08-21 NOTE — CARE PLAN
The patient is Watcher - Medium risk of patient condition declining or worsening    Shift Goals  Clinical Goals: Comfort Measure  Patient Goals: KEM  Family Goals: Comfort End of life care    Progress made toward(s) clinical / shift goals:    Problem: Knowledge Deficit - Comfort Care  Goal: Patient and family/care givers will demonstrate understanding of dying process and grieving  Description: Target End Date:  1-3 days or as soon as patient condition allows    Provide support and education regarding the dying process and grieving.  Outcome: Progressing     Problem: Discharge Planning - Comfort Care  Goal: Patient's continuum of care needs are met  Description: Target End Date:  1 to 3 days    1.  Collaborate with Case Management/ for discharge needs  2.  Consider End of Life Care Discussion (aka hospice) referral  3.  Donor referral if patient meets criteria (vented patient)  4.  Consider Two Nurses May Pronounce order  5.  Discontinue meds, labs and diagnostic tests per order  Outcome: Progressing       Patient is not progressing towards the following goals:

## 2024-08-21 NOTE — PROGRESS NOTES
Report received. Assumed care. Pt follows commands to family. VS WNL. Pt on 2LNC per family request. Updated daughters at BS on the POC for the care for the day. Comfort Care measures at this time. White board updated, All question answered. Call light within reach, bed is in the lowest position. Pt has no other needs at this time.